# Patient Record
Sex: MALE | Race: WHITE | NOT HISPANIC OR LATINO | Employment: OTHER | URBAN - METROPOLITAN AREA
[De-identification: names, ages, dates, MRNs, and addresses within clinical notes are randomized per-mention and may not be internally consistent; named-entity substitution may affect disease eponyms.]

---

## 2017-08-16 ENCOUNTER — HISTORICAL (OUTPATIENT)
Dept: RADIOLOGY | Facility: HOSPITAL | Age: 67
End: 2017-08-16

## 2017-08-24 ENCOUNTER — HISTORICAL (OUTPATIENT)
Dept: RADIOLOGY | Facility: HOSPITAL | Age: 67
End: 2017-08-24

## 2017-09-05 ENCOUNTER — HISTORICAL (OUTPATIENT)
Dept: RADIOLOGY | Facility: HOSPITAL | Age: 67
End: 2017-09-05

## 2017-09-26 ENCOUNTER — HISTORICAL (OUTPATIENT)
Dept: LAB | Facility: HOSPITAL | Age: 67
End: 2017-09-26

## 2017-11-03 ENCOUNTER — HISTORICAL (OUTPATIENT)
Dept: LAB | Facility: HOSPITAL | Age: 67
End: 2017-11-03

## 2018-01-10 ENCOUNTER — HISTORICAL (OUTPATIENT)
Dept: RADIOLOGY | Facility: HOSPITAL | Age: 68
End: 2018-01-10

## 2018-02-08 ENCOUNTER — HISTORICAL (OUTPATIENT)
Dept: RADIOLOGY | Facility: HOSPITAL | Age: 68
End: 2018-02-08

## 2018-02-16 ENCOUNTER — HISTORICAL (OUTPATIENT)
Dept: LAB | Facility: HOSPITAL | Age: 68
End: 2018-02-16

## 2018-04-10 ENCOUNTER — HISTORICAL (OUTPATIENT)
Dept: LAB | Facility: HOSPITAL | Age: 68
End: 2018-04-10

## 2018-04-16 ENCOUNTER — HISTORICAL (OUTPATIENT)
Dept: SURGERY | Facility: HOSPITAL | Age: 68
End: 2018-04-16

## 2018-06-14 ENCOUNTER — HISTORICAL (OUTPATIENT)
Dept: RADIOLOGY | Facility: HOSPITAL | Age: 68
End: 2018-06-14

## 2018-10-03 ENCOUNTER — HISTORICAL (OUTPATIENT)
Dept: RADIOLOGY | Facility: HOSPITAL | Age: 68
End: 2018-10-03

## 2018-11-05 ENCOUNTER — HISTORICAL (OUTPATIENT)
Dept: LAB | Facility: HOSPITAL | Age: 68
End: 2018-11-05

## 2019-05-13 ENCOUNTER — HISTORICAL (OUTPATIENT)
Dept: LAB | Facility: HOSPITAL | Age: 69
End: 2019-05-13

## 2019-05-13 LAB
ALBUMIN SERPL-MCNC: 3.6 GM/DL (ref 3.4–5)
ALBUMIN/GLOB SERPL: 0.9 RATIO
ALP SERPL-CCNC: 84 UNIT/L (ref 30–113)
ALT SERPL-CCNC: 33 UNIT/L (ref 10–45)
AST SERPL-CCNC: 31 UNIT/L (ref 15–37)
BILIRUB SERPL-MCNC: 0.3 MG/DL (ref 0.1–0.9)
BILIRUBIN DIRECT+TOT PNL SERPL-MCNC: 0.1 MG/DL
BILIRUBIN DIRECT+TOT PNL SERPL-MCNC: 0.2 MG/DL (ref 0–0.3)
BUN SERPL-MCNC: 8 MG/DL (ref 10–20)
CALCIUM SERPL-MCNC: 8.8 MG/DL (ref 8–10.5)
CHLORIDE SERPL-SCNC: 99 MMOL/L (ref 100–108)
CHOLEST SERPL-MCNC: 148 MG/DL (ref 140–200)
CHOLEST/HDLC SERPL: 2 MG/DL (ref 0–8)
CO2 SERPL-SCNC: 29 MMOL/L (ref 21–35)
CREAT SERPL-MCNC: 0.9 MG/DL (ref 0.7–1.3)
GLOBULIN SER-MCNC: 3.8 GM/DL
GLUCOSE SERPL-MCNC: 99 MG/DL (ref 75–116)
HDLC SERPL-MCNC: 90 MG/DL (ref 35–59)
LDLC SERPL CALC-MCNC: 41 MG/DL (ref 100–129)
POTASSIUM SERPL-SCNC: 4 MMOL/L (ref 3.6–5.2)
PROT SERPL-MCNC: 7.4 GM/DL (ref 6.4–8.2)
SODIUM SERPL-SCNC: 138 MMOL/L (ref 135–145)
TRIGL SERPL-MCNC: 62 MG/DL (ref 35–150)
VIT B12 SERPL-MCNC: 275 PG/ML (ref 193–986)
VLDLC SERPL CALC-MCNC: 12 MG/DL

## 2019-05-14 LAB — FOLATE SERPL-MCNC: 27.4 NG/ML (ref 3.1–17.5)

## 2019-07-30 ENCOUNTER — HISTORICAL (OUTPATIENT)
Dept: RADIOLOGY | Facility: HOSPITAL | Age: 69
End: 2019-07-30

## 2019-11-04 ENCOUNTER — HISTORICAL (OUTPATIENT)
Dept: LAB | Facility: HOSPITAL | Age: 69
End: 2019-11-04

## 2019-11-12 ENCOUNTER — HISTORICAL (OUTPATIENT)
Dept: LAB | Facility: HOSPITAL | Age: 69
End: 2019-11-12

## 2020-10-08 ENCOUNTER — HISTORICAL (OUTPATIENT)
Dept: LAB | Facility: HOSPITAL | Age: 70
End: 2020-10-08

## 2020-10-19 ENCOUNTER — HOSPITAL ENCOUNTER (OUTPATIENT)
Dept: MEDSURG UNIT | Facility: HOSPITAL | Age: 70
End: 2020-10-19
Attending: SURGERY | Admitting: SURGERY

## 2020-11-03 ENCOUNTER — HISTORICAL (OUTPATIENT)
Dept: LAB | Facility: HOSPITAL | Age: 70
End: 2020-11-03

## 2020-11-18 ENCOUNTER — HISTORICAL (OUTPATIENT)
Dept: RADIOLOGY | Facility: HOSPITAL | Age: 70
End: 2020-11-18

## 2021-03-23 ENCOUNTER — HOSPITAL ENCOUNTER (OUTPATIENT)
Dept: ONCOLOGY | Facility: HOSPITAL | Age: 71
End: 2021-03-24
Attending: SURGERY | Admitting: SURGERY

## 2021-09-13 ENCOUNTER — HISTORICAL (OUTPATIENT)
Dept: RADIOLOGY | Facility: HOSPITAL | Age: 71
End: 2021-09-13

## 2021-12-02 ENCOUNTER — HISTORICAL (OUTPATIENT)
Dept: LAB | Facility: HOSPITAL | Age: 71
End: 2021-12-02

## 2021-12-02 LAB
ABS NEUT (OLG): 4.6 X10(3)/MCL (ref 1.5–6.9)
ALBUMIN SERPL-MCNC: 3.5 GM/DL (ref 3.4–4.8)
ALBUMIN/GLOB SERPL: 1 RATIO (ref 1.1–2)
ALP SERPL-CCNC: 122 UNIT/L (ref 40–150)
ALT SERPL-CCNC: 36 UNIT/L (ref 0–55)
AST SERPL-CCNC: 48 UNIT/L (ref 5–34)
BASOPHILS # BLD AUTO: 0.1 X10(3)/MCL (ref 0–0.1)
BASOPHILS NFR BLD AUTO: 1 % (ref 0–1)
BILIRUB SERPL-MCNC: 1 MG/DL
BILIRUBIN DIRECT+TOT PNL SERPL-MCNC: 0.5 MG/DL (ref 0–0.5)
BILIRUBIN DIRECT+TOT PNL SERPL-MCNC: 0.5 MG/DL (ref 0–0.8)
BUN SERPL-MCNC: 6 MG/DL (ref 8.4–25.7)
CALCIUM SERPL-MCNC: 9.4 MG/DL (ref 8.7–10.5)
CHLORIDE SERPL-SCNC: 98 MMOL/L (ref 98–107)
CHOLEST SERPL-MCNC: 127 MG/DL
CHOLEST/HDLC SERPL: 2 {RATIO} (ref 0–5)
CO2 SERPL-SCNC: 34 MMOL/L (ref 23–31)
CREAT SERPL-MCNC: 0.73 MG/DL (ref 0.73–1.18)
EOSINOPHIL # BLD AUTO: 0.1 X10(3)/MCL (ref 0–0.6)
EOSINOPHIL NFR BLD AUTO: 1 % (ref 0–5)
ERYTHROCYTE [DISTWIDTH] IN BLOOD BY AUTOMATED COUNT: 12.5 % (ref 11.5–17)
FOLATE SERPL-MCNC: 5.3 NG/ML (ref 7–31.4)
GLOBULIN SER-MCNC: 3.5 GM/DL (ref 2.4–3.5)
GLUCOSE SERPL-MCNC: 89 MG/DL (ref 82–115)
HCT VFR BLD AUTO: 42.3 % (ref 42–52)
HDLC SERPL-MCNC: 67 MG/DL (ref 35–60)
HGB BLD-MCNC: 14.4 GM/DL (ref 14–18)
IMM GRANULOCYTES # BLD AUTO: 0.02 10*3/UL (ref 0–0.02)
IMM GRANULOCYTES NFR BLD AUTO: 0.3 % (ref 0–0.43)
LDLC SERPL CALC-MCNC: 50 MG/DL (ref 50–140)
LYMPHOCYTES # BLD AUTO: 1.9 X10(3)/MCL (ref 0.5–4.1)
LYMPHOCYTES NFR BLD AUTO: 24 % (ref 15–40)
MCH RBC QN AUTO: 35 PG (ref 27–34)
MCHC RBC AUTO-ENTMCNC: 34 GM/DL (ref 31–36)
MCV RBC AUTO: 103 FL (ref 80–99)
MONOCYTES # BLD AUTO: 1 X10(3)/MCL (ref 0–1.1)
MONOCYTES NFR BLD AUTO: 13 % (ref 4–12)
NEUTROPHILS # BLD AUTO: 4.6 X10(3)/MCL (ref 1.5–6.9)
NEUTROPHILS NFR BLD AUTO: 60 % (ref 43–75)
PLATELET # BLD AUTO: 292 X10(3)/MCL (ref 140–400)
PMV BLD AUTO: 9 FL (ref 6.8–10)
POTASSIUM SERPL-SCNC: 4.4 MMOL/L (ref 3.5–5.1)
PROT SERPL-MCNC: 7 GM/DL (ref 5.8–7.6)
RBC # BLD AUTO: 4.09 X10(6)/MCL (ref 4.7–6.1)
SODIUM SERPL-SCNC: 138 MMOL/L (ref 136–145)
TRIGL SERPL-MCNC: 52 MG/DL (ref 34–140)
VIT B12 SERPL-MCNC: 467 PG/ML (ref 213–816)
VLDLC SERPL CALC-MCNC: 10 MG/DL
WBC # SPEC AUTO: 7.7 X10(3)/MCL (ref 4.5–11.5)

## 2021-12-13 ENCOUNTER — HISTORICAL (OUTPATIENT)
Dept: ADMINISTRATIVE | Facility: HOSPITAL | Age: 71
End: 2021-12-13

## 2021-12-23 ENCOUNTER — HISTORICAL (OUTPATIENT)
Dept: LAB | Facility: HOSPITAL | Age: 71
End: 2021-12-23

## 2021-12-23 LAB
ABS NEUT (OLG): 4.6 X10(3)/MCL (ref 1.5–6.9)
APTT PPP: 32.7 SEC (ref 23.4–34.9)
BUN SERPL-MCNC: 8 MG/DL (ref 8.4–25.7)
CALCIUM SERPL-MCNC: 9.7 MG/DL (ref 8.7–10.5)
CHLORIDE SERPL-SCNC: 99 MMOL/L (ref 98–107)
CO2 SERPL-SCNC: 30 MMOL/L (ref 23–31)
CREAT SERPL-MCNC: 0.72 MG/DL (ref 0.73–1.18)
CREAT/UREA NIT SERPL: 11
ERYTHROCYTE [DISTWIDTH] IN BLOOD BY AUTOMATED COUNT: 13.7 % (ref 11.5–17)
GLUCOSE SERPL-MCNC: 115 MG/DL (ref 82–115)
HCT VFR BLD AUTO: 38.3 % (ref 42–52)
HGB BLD-MCNC: 13.4 GM/DL (ref 14–18)
INR PPP: 1.1 (ref 2–3)
MCH RBC QN AUTO: 35 PG (ref 27–34)
MCHC RBC AUTO-ENTMCNC: 35 GM/DL (ref 31–36)
MCV RBC AUTO: 101 FL (ref 80–99)
PLATELET # BLD AUTO: 213 X10(3)/MCL (ref 140–400)
PMV BLD AUTO: 9.4 FL (ref 6.8–10)
POTASSIUM SERPL-SCNC: 4.1 MMOL/L (ref 3.5–5.1)
PROTHROMBIN TIME: 14.1 SEC (ref 11.7–14.5)
RBC # BLD AUTO: 3.79 X10(6)/MCL (ref 4.7–6.1)
SODIUM SERPL-SCNC: 136 MMOL/L (ref 136–145)
WBC # SPEC AUTO: 7.1 X10(3)/MCL (ref 4.5–11.5)

## 2022-02-16 ENCOUNTER — HISTORICAL (OUTPATIENT)
Dept: RADIOLOGY | Facility: HOSPITAL | Age: 72
End: 2022-02-16

## 2022-02-18 ENCOUNTER — HISTORICAL (OUTPATIENT)
Dept: LAB | Facility: HOSPITAL | Age: 72
End: 2022-02-18

## 2022-02-18 LAB
ALBUMIN SERPL-MCNC: 3.7 G/DL (ref 3.4–4.8)
ALBUMIN/GLOB SERPL: 1.2 {RATIO} (ref 1.1–2)
ALP SERPL-CCNC: 119 U/L (ref 40–150)
ALT SERPL-CCNC: 21 U/L (ref 0–55)
AST SERPL-CCNC: 25 U/L (ref 5–34)
BILIRUB SERPL-MCNC: 0.6 MG/DL
BILIRUBIN DIRECT+TOT PNL SERPL-MCNC: 0.2 (ref 0–0.8)
BILIRUBIN DIRECT+TOT PNL SERPL-MCNC: 0.4 (ref 0–0.5)
BUN SERPL-MCNC: 8 MG/DL (ref 8.4–25.7)
CALCIUM SERPL-MCNC: 9.3 MG/DL (ref 8.7–10.5)
CHLORIDE SERPL-SCNC: 95 MMOL/L (ref 98–107)
CO2 SERPL-SCNC: 29 MMOL/L (ref 23–31)
CREAT SERPL-MCNC: 0.77 MG/DL (ref 0.73–1.18)
GLOBULIN SER-MCNC: 3.2 G/DL (ref 2.4–3.5)
GLUCOSE SERPL-MCNC: 98 MG/DL (ref 82–115)
HEMOLYSIS INTERF INDEX SERPL-ACNC: 3
ICTERIC INTERF INDEX SERPL-ACNC: 0
LIPEMIC INTERF INDEX SERPL-ACNC: <0
POTASSIUM SERPL-SCNC: 4.1 MMOL/L (ref 3.5–5.1)
PROT SERPL-MCNC: 6.9 G/DL (ref 5.8–7.6)
PSA SERPL-MCNC: 0.68 NG/ML
SODIUM SERPL-SCNC: 134 MMOL/L (ref 136–145)
TSH SERPL-ACNC: 1.65 M[IU]/L (ref 0.35–4.94)

## 2022-02-19 LAB — DEPRECATED CALCIDIOL+CALCIFEROL SERPL-MC: 17.3 NG/ML (ref 30–80)

## 2022-03-08 ENCOUNTER — HISTORICAL (OUTPATIENT)
Dept: ADMINISTRATIVE | Facility: HOSPITAL | Age: 72
End: 2022-03-08

## 2022-03-08 ENCOUNTER — HISTORICAL (OUTPATIENT)
Dept: RADIOLOGY | Facility: HOSPITAL | Age: 72
End: 2022-03-08

## 2022-04-07 ENCOUNTER — HISTORICAL (OUTPATIENT)
Dept: ADMINISTRATIVE | Facility: HOSPITAL | Age: 72
End: 2022-04-07
Payer: MEDICARE

## 2022-04-24 VITALS
BODY MASS INDEX: 27.09 KG/M2 | SYSTOLIC BLOOD PRESSURE: 126 MMHG | HEIGHT: 72 IN | DIASTOLIC BLOOD PRESSURE: 77 MMHG | WEIGHT: 200 LBS

## 2022-04-30 NOTE — OP NOTE
DATE OF SURGERY:    03/23/2021    SURGEON:  Sarthak Alcantar MD    PROCEDURE:    1. Open reduction and internal fixation of zygomaxillary complex fracture.  2. Left orbital floor reconstruction with orbital floor implant.  3. Bilateral forced duction.    INDICATION FOR PROCEDURE:  This is a 70-year-old gentleman.  He unfortunately had a fall on the 14th of this month, kind of fell as he stood up too fast on his porch and hit the left side of his face.  He had a CT maxillofacial done by our emergency room colleagues, which revealed a zygomaxillary complex fracture as well as fairly large orbital floor fracture on the left side.  Options were discussed, and the patient elected to proceed with open reduction and internal fixation of the ZMC fracture and left orbital floor reconstruction with placement of orbital floor implant.  Informed consent was obtained.    DESCRIPTION OF PROCEDURE:  The patient was identified in the preoperative holding area.  Informed consent was reviewed.  He was subsequently taken to the operating room and placed in supine position.  General endotracheal anesthesia was provided.  He was prepped and draped in sterile surgical fashion.  Time-out was taken.  Everyone in agreement.  I initially started the incision by injecting 1% lidocaine with epinephrine into our planned areas of incision that I marked with a marking pen.  This was a lateral brow incision as well as an orbital rim incision and a left-sided buccal sulcus incision and upon the maxilla.  After lidocaine was injected, we gave sufficient time for the vasoconstrictive effects to occur.  I initially started by exposing the fracture.  I incised the left eyebrow incision further, dissected down to the fracture line, which was readily identified.  It was very mildly displaced.  I used periosteal elevator to clean the periosteum off this fracture line and also evaluated the lateral orbital wall and once again, then further incised the  infraorbital incision and went down through the subcutaneous tissues with electrocautery and the orbicularis oris muscle down to the orbital rim.  Orbital rim periosteum was incised with a periosteal elevator, then used periosteal elevator to clean off the zygoma leading onto the maxilla.  I did notice that the fracture essentially extended down through the infraorbital foramen.  It was very mildly displaced as well.  I then once again performed a 3rd incision intraorally.  This was incised using a [place] tip Bovie.  Further dissection was achieved with a periosteal elevator.  After I reached the periosteum, I readily identified the fracture as well and cleaned the periosteum off the bone.  Using multiple techniques, I was able to move the zygomatic bone into what appeared to be very good anatomic reduction.  I subsequently then elected to start plating, initially started plating the zygomaticofrontal suture.  This was plated with a 4-hole plate that had slight extension in between the holes on both sides that spanned the defect.  This was secured while holding reduction and subsequently secured with screws.  I evaluated and ensured the reduction was good, evaluating the lateral orbital rim to make the reduction was good.  It was noted to be very good.  I then subsequently placed another plate.  We used the orbital rim plate which I had trimmed to 5 holes and then subsequently placed a total of 4 screws to hold it in place.  It appeared to be very good reduction and held well.  I then subsequently finally placed my left zygomaxillary plate.  This was an L-shaped plate and additionally placed an additional 4 screws to hold it in place, which appeared to be holding very well and a good anatomical reduction.  I then further explored the orbital floor, elevating the periosteum posteriorly and using malleable-type retractors to reduce the orbital contents.  It was noted to be a very large orbital floor fracture.  I  subsequently reduced the contents so the posterior edge measured the size of orbital floor defect.  I subsequently obtained a Titan Medpor 3D implant, trimmed it to the appropriate size and then placed it within the defect, spanning the defect, and secured it to the orbital rim with 2 screws.  I then copiously irrigated out the wound with Betadine solution, washed them out further with saline, ensured my reduction was good.  I did do a forced duction to ensure that after placement of orbital floor implant nothing was entrapped, which it was not.  I then subsequently began with closure of the incisions.  The eyebrow incision was closed with 4-0 Vicryl sutures in the deep subcutaneous tissues and muscle, and then 5-0 Prolene on the skin.  The infraorbital incision was similarly closed with 4-0 Vicryl sutures to resuspend the soft tissues, 4-0 Vicryl to close the orbicularis and then a 5-0 Prolene on the skin.  Regarding intraoral incision, this was closed with a 3-0 chromic suture in a running fashion and then further reinforced with interrupted 4-0 Vicryl sutures.  Please note, we did use Peridex solution on the patient to wash out the patient's mouth before and after the procedure.  There were no complications.  All counts were correct.        ______________________________  MD BELINDA Easley/MEENU  DD:  03/23/2021  Time:  11:48AM  DT:  03/23/2021  Time:  12:22PM  Job #:  [id number]

## 2022-04-30 NOTE — ED PROVIDER NOTES
Patient:   Modesto Lorenzo            MRN: [id number]            FIN: [id number]               Age:   69 years     Sex:  Male     :  1950   Associated Diagnoses:   Distal esophageal obstruction due to foreign body   Author:   Lauren Perkins MD      Basic Information   Time seen: Date & time 10/19/2020 16:00:00.   History source: Patient.   Arrival mode: Private vehicle, walking.   History limitation: None.   Additional information: Patient's physician(s): Roland Santiago MD, Chief Complaint from Nursing Triage Note : Chief Complaint   10/19/2020 15:45 CDT     Chief Complaint           c/o having piece of steak stuck in throat since Saturday night, was seen at Urgent care today  .      History of Present Illness   The patient presents with a foreign body in the throat.  The onset was 3  days ago and 10/17/2020 .  The course/duration of symptoms is constant.  Symptoms: difficulty swallowing, Unable to swallow anything including secretions and not tolerating secretions.  Location: esophagus. The type of foreign body is meat.  The degree of symptoms is moderate.  The exacerbating factor is none.  The relieving factor is none.  Risk factors consist of none.  Prior episodes: occurred one other time.  Therapy today: none.  Associated symptoms: none.        Review of Systems   Constitutional symptoms:  Negative except as documented in HPI.   Skin symptoms:  Negative except as documented in HPI.   Eye symptoms:  Negative except as documented in HPI.   ENMT symptoms:  Negative except as documented in HPI.   Respiratory symptoms:  Negative except as documented in HPI.   Cardiovascular symptoms:  Negative except as documented in HPI.   Gastrointestinal symptoms:  Negative except as documented in HPI.   Genitourinary symptoms:  Negative except as documented in HPI.   Musculoskeletal symptoms:  Negative except as documented in HPI.   Neurologic symptoms:  Negative except as documented in HPI.    Psychiatric symptoms:  Negative except as documented in HPI.   Endocrine symptoms:  Negative except as documented in HPI.   Hematologic/Lymphatic symptoms:  Negative except as documented in HPI.   Allergy/immunologic symptoms:  Negative except as documented in HPI.             Additional review of systems information: All systems reviewed as documented in chart.      Health Status   Allergies:    Allergies (1) Active Reaction  No Known Medication Allergies None Documented  , no known allergies.   Medications:  (Selected)   Inpatient Medications  Ordered  Naropin 0.5% injectable solution: 30 mL, 150 mg =, form: Injection, Ax Block, Once, first dose 04/16/18 7:13:00 CDT, stop date 04/16/18 7:13:00 CDT, NOW, Right Interscalene Block actual procedure  Pending Complete  midazolam: 2 mg, form: Injection, IV Push, q5min, Order duration: 2 dose(s), first dose 04/16/18 6:20:00 CDT, stop date 04/16/18 6:29:00 CDT, (up to 5 mg for moderate anxiety)  Prescriptions  Prescribed  Norco 7.5 mg-325 mg oral tablet: 1 tab(s), Oral, q4hr, PRN PRN for pain, # 40 tab(s), 0 Refill(s), [facility]  Phenergan 12.5 mg Tab: 12.5 mg = 1 tab(s), Oral, q6hr, PRN PRN for nausea, # 15 tab(s), 0 Refill(s)  aspirin 81 mg oral Delayed Release (EC) tablet: 81 mg = 1 tab(s), Oral, Daily, # 10 tab(s), 0 Refill(s)  Documented Medications  Documented  ATORVASTATIN 20 MG TABLET: 20 mg = 1 tab(s), Oral, Daily  CITALOPRAM HBR 20 MG TABLET: 20 mg = 1 tab(s), Oral, Daily  FOLIC ACID 1 MG TABLET: 1 mg = 1 tab(s), Oral, Daily  LOSARTAN POTASSIUM 100 MG TAB: 100 mg = 1 tab(s), Oral, Daily  Zolpidem 10mg Tab: 10 mg = 1 tab(s), Oral, qPM.      Past Medical/ Family/ Social History   Medical history:    Active  Tubular adenoma of colon ([id number]): Onset on 12/14/2016 at 65 years.  Comments:  12/16/2016 CST 7:35 CST - Carrol De Jesus LPN  ascending colon biopsy tubular adenoma  Colon polyps  ([id number])  Erectile dysfunction ([id number])  Essential hypertension (07176840)  Obstructive sleep apnea ([id number])  Idiopathic peripheral neuropathy ([id number])  Comments:  2016 CST 8:23 Carrol Rizo LPN.  both feet  Pressure ulcer with abrasion, blister, partial thickness skin loss involving epidermis and/or dermis ([id number])  Screening for colon cancer ([id number]).   Surgical history:    ORIF Humerus (Right) on 2018 at 67 Years.  Comments:  2018 9:26 Rom Rolle RN  auto-populated from documented surgical case  Biopsy Gastrointestional on 2016 at 65 Years.  Comments:  2016 9:40 Melida Landry RN  auto-populated from documented surgical case  Colon Tattoo on 2016 at 65 Years.  Comments:  2016 9:40 Melida Landry RN  auto-populated from documented surgical case  Polypectomy on 2016 at 65 Years.  Comments:  2016 9:40 Melida Landry RN  auto-populated from documented surgical case  Colonoscopy on 2016 at 65 Years.  Comments:  2016 9:40 Melida Landry RN  auto-populated from documented surgical case  Colonoscopy ([id number]) in the month of 2009 at 58 Years.  Angiogram ([id number]).  Comments:  2018 16:03 Radha Ochoa W/DR. LYRIC BOUDREAUX 2017..   Family history:    Primary malignant neoplasm of lung  Father ()  .   Social history:    Social & Psychosocial Habits    Alcohol  2018  Use: Current    Frequency: Daily    Comment: Sociable - 2016 08:20 - Carrol De Jesus LPN    Employment/School  2016  Status: Unemployed    Home/Environment  2016  Lives with: Spouse    Living situation: Home/Independent    Alcohol abuse in household: No    Substance abuse in household: No    Smoker in household: No    Injuries/Abuse/Neglect in household: No    Feels unsafe at home: No    Safe place to go:  Yes    Agency(s)/Others notified: No    Family/Friends available to help: Yes    Concern for family members at home: No    Major illness in household: No    Financial concerns: No    Concerns over TV/Computer/Game use: No    Nutrition/Health  11/09/2016  Type of diet: Regular    Substance Use  03/21/2018  Use: Current    Type: Marijuana    Frequency: Daily    Comment: Denies - 11/09/2016 08:22 - Carrol De Jesus LPN    Tobacco  04/13/2018  Use: Former smoker    Comment: uses smokeless tobacco - 04/13/2018 10:11 - Clarissa Cisneros RN    10/19/2020  Use: 10 or more cigarettes (1/    Type: Cigarettes    Patient Wants Consult For Cessation Counseling No    Abuse/Neglect  10/19/2020  SHX Any signs of abuse or neglect No  , Alcohol use: Regularly, Tobacco use: Regularly, Drug use: Marijuana, Occupation: Retired, Family/social situation: .   Problem list:    Active Problems (12)  Colon polyps   Erectile dysfunction   Essential hypertension   Idiopathic peripheral neuropathy   Kidney failure   Obstructive sleep apnea   Paralysis   Pressure ulcer with abrasion, blister, partial thickness skin loss involving epidermis and/or dermis   Screening for colon cancer   Sleep apnea   Tobacco user   Tubular adenoma of colon   .      Physical Examination               Vital Signs   Vital Signs   10/19/2020 15:45 CDT     Temperature Oral          36.9 DegC                             Temperature Oral (calculated)             98.42 DegF                             Peripheral Pulse Rate     95 bpm                             Respiratory Rate          18 br/min                             SpO2                      97 %                             Oxygen Therapy            Room air                             Systolic Blood Pressure   153 mmHg  HI                             Diastolic Blood Pressure  90 mmHg  .      Vital Signs (last 24 hrs)_____  Last Charted___________  Temp Oral     36.9 DegC  (OCT 19 15:45)  Heart Rate  Peripheral   95 bpm  (OCT 19 15:45)  Resp Rate         18 br/min  (OCT 19 15:45)  SBP      H 153mmHg  (OCT 19 15:45)  DBP      90 mmHg  (OCT 19 15:45)  SpO2      97 %  (OCT 19 15:45)  Weight      84.5 kg  (OCT 19 15:45)  Height      175 cm  (OCT 19 15:45)  BMI      27.59  (OCT 19 15:45)  .   Measurements   10/19/2020 15:45 CDT     Weight Dosing             84.5 kg                             Weight Measured           84.5 kg                             Weight Measured and Calculated in Lbs     186.29 lb                             Height/Length Dosing      175 cm                             Height/Length Measured    175 cm                             Body Mass Index Measured  27.59 kg/m2  .   Basic Oxygen Information   10/19/2020 15:45 CDT     SpO2                      97 %                             Oxygen Therapy            Room air  .   General:  Alert, no acute distress.    Skin:  Warm, dry, intact, normal for ethnicity.    Head:  Normocephalic, atraumatic.    Neck:  Supple, no tenderness.    Eye:  Pupils are equal, round and reactive to light, extraocular movements are intact.    Ears, nose, mouth and throat:  Oral mucosa moist, Throat: Bilateral, normal.    Cardiovascular:  Regular rate and rhythm, No murmur, Normal peripheral perfusion, No edema.    Respiratory:  Lungs are clear to auscultation, respirations are non-labored, breath sounds are equal, Symmetrical chest wall expansion.    Chest wall:  No tenderness, No deformity.    Back:  Nontender, Normal range of motion, Normal alignment.    Musculoskeletal:  Normal ROM, normal strength, no tenderness, no swelling, no deformity.    Gastrointestinal:  Soft, Nontender, Non distended, Normal bowel sounds.    Neurological:  Alert and oriented to person, place, time, and situation, No focal neurological deficit observed, normal sensory observed, normal motor observed, normal speech observed, normal coordination observed.    Lymphatics:  No lymphadenopathy.    Psychiatric:  Cooperative, appropriate mood & affect, normal judgment.       Medical Decision Making   Documents reviewed:  Emergency department nurses' notes.   Orders  Launch Orders   Radiology:  XR Barium Swallow Esophagram (Order): Routine, 10/19/2020 16:04 CDT, Other (please specify), esophageal foreign body, None, Wheelchair, Patient Has IV?, Rad Type, Gastrografin, Schedule this test, [facility], Launch Orders   Laboratory:  PTT (Order): Stat collect, 10/19/2020 17:28 CDT, Blood, Lab Collect, 10/19/2020 17:28 CDT  INR - Protime (Order): Stat collect, 10/19/2020 17:28 CDT, Blood, Lab Collect, 10/19/2020 17:28 CDT  CMP (Order): Stat collect, 10/19/2020 17:28 CDT, Blood, Lab Collect, 10/19/2020 17:28 CDT  CBC w/ Auto Diff (Order): Now collect, 10/19/2020 17:28 CDT, Blood, Lab Collect, 10/19/2020 17:28 CDT  Patient Care:  Saline Lock Insert (Order): 10/19/2020 17:28 CDT  Radiology:  XR Chest 1 View (Order): Stat, 10/19/2020 17:28 CDT, Pre-Op, None, Portable, Patient Has IV?, Rad Type, Not Scheduled  Cardiology:  EKG (Order): 10/19/2020 17:28 CDT, Stat, Once, 10/19/2020 17:28 CDT, Patient Bed, Patient Has IV, Standard Precautions, -1, -1, 10/19/2020 17:28 CDT.    Electrocardiogram:  Time 10/19/2020 17:35:00, rate 70, normal sinus rhythm, No ST-T changes, no ectopy, EP Interp, sinus rhythm with 1st degree AV Block.    Results review:  Lab results : Lab View   10/19/2020 17:35 CDT     WBC                       7.4 x10(3)/mcL                             RBC                       3.95 x10(6)/mcL  LOW                             Hgb                       14.2 gm/dL                             Hct                       40.6 %  LOW                             Platelet                  213 x10(3)/mcL                             MCV                       103 fL  HI                             MCH                       36 pg  HI                             MCHC                      35 gm/dL                              RDW                       12.8 %                             MPV                       8.7 fL                             Abs Neut                  4.6 x10(3)/mcL                             Neutro Auto               63 %                             Lymph Auto                17 %                             Mono Auto                 18 %  HI                             Eos Auto                  0 %                             Abs Eos                   0.0 x10(3)/mcL                             Basophil Auto             1 %                             Abs Neutro                4.6 x10(3)/mcL                             Abs Lymph                 1.3 x10(3)/mcL                             Abs Mono                  1.4 x10(3)/mcL  HI                             Abs Baso                  0.1 x10(3)/mcL                             IG%                       0.300 %                             IG#                       0.0200  HI  ,    No qualifying data available.    Radiology results:  Rad Results (ST)  < 12 hrs   Accession: [id number]  Order: XR Esophagram Single Contrast  Report Dt/Tm: 10/19/2020 17:16  Report:   ESOPHAGRAM:     FLUORO TIME USED:     HISTORY: Foreign body evaluation     IMPRESSION:     There is a luminal filling defect at the distal esophagus immediately  proximal to or at the the gastroesophageal junction. Findings are  consistent with retained foreign body versus mass lesion    .   Notes:  Patient's presenting condition, medical history, lab results, and imaging discussed with Dr. Perkins who had face time with patient and agrees with treatment plan..      Reexamination/ Reevaluation   Vital signs   Basic Oxygen Information   10/19/2020 15:45 CDT     SpO2                      97 %                             Oxygen Therapy            Room air     Notes:     I am Dr. Perkins, I assumed the care of patient after initial workup was started by NP.    Patient presented to the emergency room with  inability to swallow since Saturday when he ate a steak and it sort of stuck behind his lower chest.      Awake, alert, no distress.  Talking in full sentences  Heart: S1, S2 are audible.  There is no S3 no S4, no murmurs.  Chest is clear to auscultation.  No rales, no rhonchi.  Abdomen is soft, nondistended, nontender.  Bowel sounds are audible.  CNS: No focal deficit  Capillary refill is less than 2 seconds.  Peripheral pulses are palpable bilaterally symmetrical      Patient's workup in the emergency room reveals he has either a FB or mass lesion in lower esophagus. pt. has h/o same thing 2 years back also.    I called Dr. Clyde Sapp, and he is going to see the patient.    Components of this note were documented using voice recognition systems and are subject to errors not corrected at proof reading.  Please contact the author for any clarifications..      Impression and Plan   Diagnosis   Distal esophageal obstruction due to foreign body ([id number] T18.108A)      Calls-Consults   -  10/19/2020 17:45:00 , Senait GARCIA, Jeaneth LIU, recommends OK .    Plan   Condition: Stable.    Disposition: Patient care transitioned to: Time: 10/19/2020 17:43:00, [facility]    Counseled: Patient.    Orders: Launch Orders   Admit/Transfer/Discharge:  Place in Outpatient Observation (Order): 10/19/2020 17:56 CDT, Medical Unit Senait GARCIA, Jeaneth LIU, No, Dysphagia, lower esophageal foreign body.       Addendum      Teaching-Supervisory Addendum-Brief   Notes:         I am Dr. Perkins and I performed a face to face evaluation of this patient. This case was initially evaluated and workup started by Nurse practitioner under my supervision.    I participated in the following activities of this patients care: the medical history.   I personally performed: the Brief physical exam, the medical decision making.   Evaluation and management service: I agree with the evaluation and management decisions made in this patient's care.     Results  interpretation: I agree with the study interpretation in this patient's care     See reevaluation area for my evaluation of the pt.  .

## 2022-04-30 NOTE — OP NOTE
Patient:   Modesto Lorenzo            MRN: [id number]            FIN: [id number]               Age:   67 years     Sex:  Male     :  1950   Associated Diagnoses:   None   Author:   Javid Wilkins MD      OPERATIVE REPORT    DATE: 2018    SURGEON: Javid Wilkins MD    ASSISTANT: Saniya Pope    PREOPERATIVE DIAGNOSIS:   1.  Right greater tuberosity fracture    POSTOPERATIVE DIAGNOSIS:  Same    PROCEDURE:  1.  Open reduction internal fixation of right greater tuberosity fracture    TYPE OF ANESTHESIA:    General anesthesia nerve    BLOOD LOSS:  75 cc    DVT PROPHYLAXIS:   This patient placed on aspirin probably for 2 weeks.    INSTRUMENTATION:  Arthrex proximal humerus locking plate    HISTORY AND INDICATIONS:  Refer to the last orthopedic office visit note    PROCEDURE IN DETAIL:     Open reduction internal fixation of right greater tuberosity fracture    The patient is placed in a beach chair position and bony prominences were padded and the neck was placed in a neutral position. The patient was prepped and draped in normal sterile fashion and a time-out was done.    An incision from the coracoid process to the axilla was made. The cephalic vein was visualized and the deltopectoral interval was made taking the vein laterally. Then the subscapularis was visualized. Retractors were place laterally, superiorly, and medially.     I could then visualize the fracture with more of a supraspinatus/infraspinatus attachment to the greater tuberosity.  I could visualize the biceps tendon.  I placed the plate just lateral to the biceps tendon.  I placed the height of the plate using fluoroscopy.  I want to make sure the plate would not impinge on the acromion.  I then placed screws into the distal end of the plate to pull it to the bone.  I then used the plate proximally as anchor.  I did this because the fracture was highly comminuted.  I then used #5 FiberWire to place through the tendon bone interface and  then tied through the plate and used the plate is an anchor.  I did this for the supraspinatus and infraspinatus portions of the tendon.  I confirmed the tuberosity reduction with fluoroscopy and looked appropriate.  I then thoroughly irrigated out the wound with Pulsavac.    Deltopectoral fascia was closed with 1-0 vicryl.. The skin was closed with a 2-0 vicryl and skin with staples.

## 2022-04-30 NOTE — OP NOTE
Patient:   Modesto Lorenzo            MRN: [id number]            FIN: [id number]               Age:   69 years     Sex:  Male     :  1950   Associated Diagnoses:   Throat foreign body; Distal esophageal obstruction due to foreign body   Author:   Jeaneth Sapp MD      Operative Note   Operative Information   Date/ Time:  10/19/2020 19:59:00.     Procedures Performed: Procedure Code   Esophagogastroduodenoscopy, flexible, transoral; with removal of foreign body(s) (01838)..     Indications: 69-year-old white male with difficulty with swallowing for approximately 2 days following a steak dinner.  Patient having a previous history of the same which spontaneously resolved with drinking liquids however at this time he has complete impaction and obstruction of the esophagus needing emergent extraction.     Preoperative Diagnosis: Throat foreign body (PNED [id number]), Distal esophageal obstruction due to foreign body (UIT35-GJ T18.108A).     Postoperative Diagnosis: Throat foreign body (PNED [id number]), Distal esophageal obstruction due to foreign body (VGO45-LQ T18.108A).     Surgeon: Jeaneth Sapp MD.     Anesthesia: General.     Speciman Removed: None.     Description of Procedure/Findings/    Complications: Patient was brought to the operative theater and general endotracheal intubation anesthesia was provided for protection of the airway.  An endoscope was then passed through the oropharynx intubating the esophagus with transit to about the mid to distal esophagus.  At this point a large bolus of meat was identified.  This was likely the steak dinner he had on Saturday night.  Slight pressure was applied with easy transit into the stomach.  Upon entering the stomach it was fully insufflated this endoscope was advanced to the duodenum upon entering through the pylorus significant hyperemic changes are noted of the duodenum without ulceration  or bleeding.  Pictures were taken the scope was returned back into the stomach the stomach was suctioned of all of its liquid content.  Scope was withdrawn back to the GE junction.  There were normal gross mucosal changes noted within this region and no stricture was found.  Scope was slowly withdrawn and all reflux and liquid was suctioned upper airway around the ET tube was suctioned well.  Scope was removed in neutral position the patient was relieved of anesthesia stable condition to transfer the postanesthesia care unit.     Esimated blood loss: No blood loss.     Findings: Complete obstruction of the distal esophagus secondary to food bolus.     Complications: None.

## 2022-09-29 ENCOUNTER — LAB VISIT (OUTPATIENT)
Dept: LAB | Facility: HOSPITAL | Age: 72
End: 2022-09-29
Attending: FAMILY MEDICINE
Payer: MEDICARE

## 2022-09-29 DIAGNOSIS — E53.8 VITAMIN B 12 DEFICIENCY: ICD-10-CM

## 2022-09-29 DIAGNOSIS — E78.2 MIXED HYPERLIPIDEMIA: Primary | ICD-10-CM

## 2022-09-29 LAB
ALBUMIN SERPL-MCNC: 4.3 GM/DL (ref 3.4–4.8)
ALBUMIN/GLOB SERPL: 1.3 RATIO (ref 1.1–2)
ALP SERPL-CCNC: 112 UNIT/L (ref 40–150)
ALT SERPL-CCNC: 59 UNIT/L (ref 0–55)
AST SERPL-CCNC: 100 UNIT/L (ref 5–34)
BILIRUBIN DIRECT+TOT PNL SERPL-MCNC: 1.6 MG/DL
BUN SERPL-MCNC: 5 MG/DL (ref 8.4–25.7)
CALCIUM SERPL-MCNC: 9.8 MG/DL (ref 8.8–10)
CHLORIDE SERPL-SCNC: 93 MMOL/L (ref 98–107)
CHOLEST SERPL-MCNC: 173 MG/DL
CHOLEST/HDLC SERPL: 1 {RATIO} (ref 0–5)
CO2 SERPL-SCNC: 30 MMOL/L (ref 23–31)
CREAT SERPL-MCNC: 0.74 MG/DL (ref 0.73–1.18)
FOLATE SERPL-MCNC: 10.2 NG/ML (ref 7–31.4)
GFR SERPLBLD CREATININE-BSD FMLA CKD-EPI: >60 MLS/MIN/1.73/M2
GLOBULIN SER-MCNC: 3.3 GM/DL (ref 2.4–3.5)
GLUCOSE SERPL-MCNC: 109 MG/DL (ref 82–115)
HDLC SERPL-MCNC: 118 MG/DL (ref 35–60)
LDLC SERPL CALC-MCNC: 46 MG/DL (ref 50–140)
POTASSIUM SERPL-SCNC: 4 MMOL/L (ref 3.5–5.1)
PROT SERPL-MCNC: 7.6 GM/DL (ref 5.8–7.6)
SODIUM SERPL-SCNC: 135 MMOL/L (ref 136–145)
TRIGL SERPL-MCNC: 46 MG/DL (ref 34–140)
TSH SERPL-ACNC: 1.93 UIU/ML (ref 0.35–4.94)
VIT B12 SERPL-MCNC: 341 PG/ML (ref 213–816)
VLDLC SERPL CALC-MCNC: 9 MG/DL

## 2022-09-29 PROCEDURE — 80053 COMPREHEN METABOLIC PANEL: CPT

## 2022-09-29 PROCEDURE — 82746 ASSAY OF FOLIC ACID SERUM: CPT

## 2022-09-29 PROCEDURE — 82607 VITAMIN B-12: CPT

## 2022-09-29 PROCEDURE — 84443 ASSAY THYROID STIM HORMONE: CPT

## 2022-09-29 PROCEDURE — 36415 COLL VENOUS BLD VENIPUNCTURE: CPT

## 2022-09-29 PROCEDURE — 80061 LIPID PANEL: CPT

## 2022-10-09 ENCOUNTER — HOSPITAL ENCOUNTER (EMERGENCY)
Facility: HOSPITAL | Age: 72
Discharge: HOME | End: 2022-10-09
Attending: INTERNAL MEDICINE
Payer: MEDICARE

## 2022-10-09 VITALS
HEIGHT: 72 IN | WEIGHT: 200 LBS | HEART RATE: 70 BPM | TEMPERATURE: 98 F | BODY MASS INDEX: 27.09 KG/M2 | OXYGEN SATURATION: 97 % | RESPIRATION RATE: 16 BRPM | SYSTOLIC BLOOD PRESSURE: 113 MMHG | DIASTOLIC BLOOD PRESSURE: 71 MMHG

## 2022-10-09 DIAGNOSIS — M54.50 CHRONIC LOW BACK PAIN WITHOUT SCIATICA, UNSPECIFIED BACK PAIN LATERALITY: ICD-10-CM
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
## 2022-10-09 DIAGNOSIS — G89.29 CHRONIC LOW BACK PAIN WITHOUT SCIATICA, UNSPECIFIED BACK PAIN LATERALITY: ICD-10-CM

## 2022-10-09 DIAGNOSIS — S50.319A ABRASION OF ELBOW, UNSPECIFIED LATERALITY, INITIAL ENCOUNTER: Primary | ICD-10-CM

## 2022-10-09 PROBLEM — G47.33 OBSTRUCTIVE SLEEP APNEA SYNDROME: Status: ACTIVE | Noted: 2022-10-09

## 2022-10-09 PROBLEM — N19 RENAL FAILURE: Status: ACTIVE | Noted: 2022-10-09

## 2022-10-09 PROBLEM — G83.9 PARALYSIS: Status: ACTIVE | Noted: 2022-10-09

## 2022-10-09 PROBLEM — E78.5 HYPERLIPIDEMIA: Status: ACTIVE | Noted: 2022-10-09

## 2022-10-09 PROBLEM — I10 PRIMARY HYPERTENSION: Status: ACTIVE | Noted: 2022-10-09

## 2022-10-09 PROBLEM — L89.92 PRESSURE INJURY, STAGE 2: Status: ACTIVE | Noted: 2022-10-09

## 2022-10-09 PROBLEM — N52.9 ERECTILE DYSFUNCTION: Status: ACTIVE | Noted: 2022-10-09

## 2022-10-09 PROCEDURE — 96372 THER/PROPH/DIAG INJ SC/IM: CPT | Performed by: NURSE PRACTITIONER

## 2022-10-09 PROCEDURE — 25000003 PHARM REV CODE 250: Performed by: INTERNAL MEDICINE

## 2022-10-09 PROCEDURE — 63600175 PHARM REV CODE 636 W HCPCS: Performed by: NURSE PRACTITIONER

## 2022-10-09 PROCEDURE — 99285 EMERGENCY DEPT VISIT HI MDM: CPT | Mod: 25

## 2022-10-09 RX ORDER — CITALOPRAM 20 MG/1
TABLET, FILM COATED ORAL
COMMUNITY
Start: 2022-08-24

## 2022-10-09 RX ORDER — TRAMADOL HYDROCHLORIDE 50 MG/1
50 TABLET ORAL EVERY 6 HOURS PRN
Qty: 12 TABLET | Refills: 0 | Status: SHIPPED | OUTPATIENT
Start: 2022-10-09 | End: 2022-10-12

## 2022-10-09 RX ORDER — HYDROCODONE BITARTRATE AND ACETAMINOPHEN 5; 325 MG/1; MG/1
1 TABLET ORAL
Status: COMPLETED | OUTPATIENT
Start: 2022-10-09 | End: 2022-10-09

## 2022-10-09 RX ORDER — KETOROLAC TROMETHAMINE 30 MG/ML
30 INJECTION, SOLUTION INTRAMUSCULAR; INTRAVENOUS
Status: COMPLETED | OUTPATIENT
Start: 2022-10-09 | End: 2022-10-09

## 2022-10-09 RX ORDER — BACLOFEN 20 MG/1
20 TABLET ORAL 3 TIMES DAILY PRN
Qty: 30 TABLET | Refills: 0 | Status: SHIPPED | OUTPATIENT
Start: 2022-10-09 | End: 2022-10-19

## 2022-10-09 RX ORDER — ATORVASTATIN CALCIUM 10 MG/1
TABLET, FILM COATED ORAL
COMMUNITY
Start: 2022-07-17

## 2022-10-09 RX ORDER — FOLIC ACID 0.4 MG
TABLET ORAL
COMMUNITY
Start: 2022-07-17

## 2022-10-09 RX ORDER — ZALEPLON 10 MG/1
10 CAPSULE ORAL NIGHTLY
COMMUNITY
Start: 2022-09-27

## 2022-10-09 RX ORDER — ALENDRONATE SODIUM 70 MG/1
TABLET ORAL
COMMUNITY
Start: 2022-08-09

## 2022-10-09 RX ADMIN — KETOROLAC TROMETHAMINE 30 MG: 60 INJECTION, SOLUTION INTRAMUSCULAR at 06:10

## 2022-10-09 RX ADMIN — HYDROCODONE BITARTRATE AND ACETAMINOPHEN 1 TABLET: 5; 325 TABLET ORAL at 07:10

## 2022-11-01 ENCOUNTER — TELEPHONE (OUTPATIENT)
Dept: EMERGENCY MEDICINE | Facility: HOSPITAL | Age: 72
End: 2022-11-01
Payer: MEDICARE

## 2023-01-24 ENCOUNTER — HOSPITAL ENCOUNTER (EMERGENCY)
Facility: HOSPITAL | Age: 73
Discharge: HOME | End: 2023-01-24
Attending: INTERNAL MEDICINE
Payer: MEDICARE

## 2023-01-24 DIAGNOSIS — R07.81 RIB PAIN: ICD-10-CM

## 2023-01-24 DIAGNOSIS — S20.211A CONTUSION OF RIGHT CHEST WALL, INITIAL ENCOUNTER: Primary | ICD-10-CM

## 2023-01-24 PROCEDURE — 99284 EMERGENCY DEPT VISIT MOD MDM: CPT | Mod: 25

## 2023-01-25 NOTE — ED PROVIDER NOTES
"  1/24/2023  10:15 PM    SOURCE OF HISTORY:  History obtained from the patient.    CHIEF COMPLAINT:  Back Pain (C/o R lower back pain x2 days. Felt "pop" while sweeping. Denies dysuria)      HISTORY OF PRESENT ILLNESS:  Modesto Lorenzo is a 72 y.o. male presenting with complaint of right back pain, which she hurt while he was backing up in his shop and hit the corner of a table, he says been hurting for the last 2 days, he also had a fall a couple of weeks back, recently had stitches removed from his right forehead, does not exactly remember the circumstances.    REVIEW OF SYSTEMS:     AS Per Hpi And Below:  General: No Fever.  No Chills.  Head: No Headache.  No Loss Of Consciousness Or Amnesia.  Eyes: No Visual Changes Reported.  Neck:  No Particular Neck Pain Reported By Patient.  Extremities:  Denied Any Pain In Extremities  Back: No particular Back Pain reported by Patient.  Right upper back, right lateral ribs pain  Respiratory: No Shortness Of Breath.  No Chest Pain.  Cardiovascular: No Palpitations.  Abdomen: No Abdominal Pain.  No Nausea Or Vomiting.  Skin: No Rashes Or Bruising.  Urinary: No Hematuria.  Neurologic: No Numbness.  No Focal Weakness.   All Other Systems Negative Except As Above As Reviewed With Patient.    ALLERGIES:  Review of patient's allergies indicates:  No Known Allergies    HOME MEDICINE LIST:  No current facility-administered medications for this encounter.    Current Outpatient Medications:     alendronate (FOSAMAX) 70 MG tablet, , Disp: , Rfl:     atorvastatin (LIPITOR) 10 MG tablet, , Disp: , Rfl:     baclofen (LIORESAL) 20 MG tablet, Take 1 tablet (20 mg total) by mouth 3 (three) times daily as needed (Muscle spasms)., Disp: 30 tablet, Rfl: 0    citalopram (CELEXA) 20 MG tablet, , Disp: , Rfl:     folic acid (FOLVITE) 400 MCG tablet, , Disp: , Rfl:     zaleplon (SONATA) 10 MG capsule, Take 10 mg by mouth every evening., Disp: , Rfl:     PAST MEDICAL HISTORY:  As per HPI and " below:  Past Medical History:   Diagnosis Date    Mixed hyperlipidemia     Osteoporosis        PAST SURGICAL HISTORY:  Past Surgical History:   Procedure Laterality Date    SHOULDER SURGERY Right        FAMILY HISTORY:  Family History   Problem Relation Age of Onset    Cancer Mother     Cancer Father     Lung cancer Father     Hypertension Maternal Grandfather     Heart disease Maternal Grandfather         SOCIAL HISTORY:  Social History     Tobacco Use    Smoking status: Every Day     Types: Cigarettes   Substance Use Topics    Alcohol use: Yes    Drug use: Yes     Types: Marijuana       PROBLEM LIST:  Patient Active Problem List    Diagnosis Date Noted    Erectile dysfunction 10/09/2022    Hyperlipidemia 10/09/2022    Obstructive sleep apnea syndrome 10/09/2022    Paralysis 10/09/2022    Renal failure 10/09/2022    Primary hypertension 10/09/2022    Pressure injury, stage 2 10/09/2022    Tubular adenoma of colon 12/14/2016       PHYSICAL EXAM:    There were no vitals filed for this visit.    There were no vitals taken for this visit.    Nursing Note And Vital Signs Reviewed.    APPEARANCE: No Acute Distress.  MENTAL STATUS: Alert And Oriented X 3.  GCS 15.  HEAD/FACE: Atraumatic.  Patient has the bruise on his right infraorbital area.  EYES:  Conjunctiva Normal.  No Subconjunctival Hemorrhage.  Extraocular Muscles Are Intact.  NECK: No Midline Cervical Tenderness, No Step Off Noted, No Deformities.  Full Range Of Motion.    BACK: No Midline Thoracic Tenderness, No Step Off Noted, No Deformities.  No Midline Lumbar Or Sacral Spine Tenderness, No Step-Offs Noted, No Deformities Noted.   CHEST:  Right posterolateral Chest Wall Tenderness. No Gross Bruising.  No ecchymosis, no crepitus, Breath Sounds Are Equal Bilaterally.  No Wheezes.  No Rhonchi.  No Rales.  CARDIOVASCULAR: Regular Rate And Rhythm.  No Murmurs.   ABDOMEN: Soft. Non Distended. No Tenderness.  No Guarding. No Rebound.   MUSCULOSKELETAL:  No Bony  Tenderness In The Extremities.  No Gross Deformities. Intact Range of Motion  SKIN: No Gross Ecchymoses. No Gross Signs Of Major Trauma.  NEUROLOGIC: No Focal Deficit. Speech Normal, Motor Grossly Normal.        MEDICAL DECISION MAKING:      Chart reviewed, Nurses Note Reviewed, Vital Reviewed.      ED WORKUP AND COURSE:  ED ORDERS:  Orders Placed This Encounter   Procedures    X-Ray Chest PA And Lateral    CT Head Without Contrast      ED MEDICINE:  Medications - No data to display    Medical Decision Making  72 y.o. male  has a past medical history of Mixed hyperlipidemia and Osteoporosis. presents to ER with right posterolateral rib pain, a fall which he does not remember the circumstances exactly, had stitches removed recently from his right supraorbital area, has a new bruise on the right infraorbital area since he can not recall the whole event, I will do a CT head and chest x-ray and decide further.        EDCOURSE:  ED Course as of 01/24/23 2318 Tue Jan 24, 2023 2317 Patient does not have any major rib fractures, he does not have any crepitus, bruising or ecchymosis in the right ribs, he mainly has just localized tenderness there, chest x-ray does not show any pneumothorax, has some haziness in the left lower lung field but he does not have any symptoms on that side, I will let him go back home with instruction to see his family doctor for further evaluation and treatment as needed.  I also did the CT head on him because of his age, a little bruise on his right infraorbital area, but the CT head is essentially negative for any subdural hematoma or bleed I will discharge him home with instruction to take Motrin or Tylenol as needed for pain and see family doctor for follow-up. [GQ]      ED Course User Index  [GQ] Lauren Perkins MD           ED Prescriptions    None       Follow-up Information       Follow up With Specialties Details Why Contact Info    Roland Santiago MD Family Medicine In 2 days   [address] [place] [address]  [phone]                   EKG:      ED LABS ORDERED AND REVIEWED:  No visits with results within 1 Day(s) from this visit.   Latest known visit with results is:   Lab Visit on 09/29/2022   Component Date Value Ref Range Status    Sodium Level 09/29/2022 135 (L)  136 - 145 mmol/L Final    Potassium Level 09/29/2022 4.0  3.5 - 5.1 mmol/L Final    Chloride 09/29/2022 93 (L)  98 - 107 mmol/L Final    Carbon Dioxide 09/29/2022 30  23 - 31 mmol/L Final    Glucose Level 09/29/2022 109  82 - 115 mg/dL Final    Blood Urea Nitrogen 09/29/2022 5.0 (L)  8.4 - 25.7 mg/dL Final    Creatinine 09/29/2022 0.74  0.73 - 1.18 mg/dL Final    Calcium Level Total 09/29/2022 9.8  8.8 - 10.0 mg/dL Final    Protein Total 09/29/2022 7.6  5.8 - 7.6 gm/dL Final    Albumin Level 09/29/2022 4.3  3.4 - 4.8 gm/dL Final    Globulin 09/29/2022 3.3  2.4 - 3.5 gm/dL Final    Albumin/Globulin Ratio 09/29/2022 1.3  1.1 - 2.0 ratio Final    Bilirubin Total 09/29/2022 1.6 (H)  <=1.5 mg/dL Final    Alkaline Phosphatase 09/29/2022 112  40 - 150 unit/L Final    Alanine Aminotransferase 09/29/2022 59 (H)  0 - 55 unit/L Final    Aspartate Aminotransferase 09/29/2022 100 (H)  5 - 34 unit/L Final    eGFR 09/29/2022 >60  mls/min/1.73/m2 Final    Cholesterol Total 09/29/2022 173  <=200 mg/dL Final    HDL Cholesterol 09/29/2022 118 (H)  35 - 60 mg/dL Final    Triglyceride 09/29/2022 46  34 - 140 mg/dL Final    Cholesterol/HDL Ratio 09/29/2022 1  0 - 5 Final    Very Low Density Lipoprotein 09/29/2022 9   Final    LDL Cholesterol 09/29/2022 46.00 (L)  50.00 - 140.00 mg/dL Final    Folate Level 09/29/2022 10.2  7.0 - 31.4 ng/mL Final    Thyroid Stimulating Hormone 09/29/2022 1.9303  0.3500 - 4.9400 uIU/mL Final    Vitamin B12 Level 09/29/2022 341  213 - 816 pg/mL Final       RADIOLOGY STUDIES ORDERED AND REVIEWED:  Imaging Results              CT Head Without Contrast (Preliminary result)  Result time 01/24/23 23:47:22       Preliminary result by Ruben Powell Jr., MD (01/24/23 23:47:22)                   Narrative:    START OF REPORT:  TECHNIQUE: CT OF THE HEAD WAS PERFORMED WITHOUT INTRAVENOUS CONTRAST WITH AXIAL AS WELL AS CORONAL AND SAGITTAL IMAGES.    COMPARISON: NONE.    DOSAGE INFORMATION: AUTOMATED EXPOSURE CONTROL WAS UTILIZED.    CLINICAL HISTORY: HEAD TRAUMA.    Findings:  Hemorrhage: No acute intracranial hemorrhage is seen.  CSF spaces: The ventricles sulci and basal cisterns are within normal limits.  Brain parenchyma: There is preservation of the grey white junction throughout. Mild scattered microvascular change is seen in portions of the periventricular and deep white matter tracts.  Cerebellum: Unremarkable.  Sella and skull base: The sella appears to be within normal limits for age.  Intracranial calcifications: Incidental note is made of bilateral choroid plexus calcification. Incidental note is made of some pineal region calcification.  Calvarium: No acute linear or depressed skull fracture is seen.    Maxillofacial Structures: Old fixation changes left mid face.  Paranasal sinuses: The visualized paranasal sinuses appear clear with no significant mucoperiosteal thickening or air fluid levels identified.  Orbits: Surgical fixation with mesh of the floor of left orbit.  Temporal bones and mastoids: The temporal bones and mastoids appear unremarkable.  TMJ: The mandibular condyles appear normally placed with respect to the mandibular fossa.      Impression:  1. No acute intracranial process identified. Details and findings as noted above.                          Preliminary result by On[employer], Rad Results In (01/24/23 23:47:22)                   Narrative:    START OF REPORT:  TECHNIQUE: CT OF THE HEAD WAS PERFORMED WITHOUT INTRAVENOUS CONTRAST WITH AXIAL AS WELL AS CORONAL AND SAGITTAL IMAGES.    COMPARISON: NONE.    DOSAGE INFORMATION: AUTOMATED EXPOSURE CONTROL WAS UTILIZED.    CLINICAL HISTORY: HEAD  TRAUMA.    Findings:  Hemorrhage: No acute intracranial hemorrhage is seen.  CSF spaces: The ventricles sulci and basal cisterns are within normal limits.  Brain parenchyma: There is preservation of the grey white junction throughout. Mild scattered microvascular change is seen in portions of the periventricular and deep white matter tracts.  Cerebellum: Unremarkable.  Sella and skull base: The sella appears to be within normal limits for age.  Intracranial calcifications: Incidental note is made of bilateral choroid plexus calcification. Incidental note is made of some pineal region calcification.  Calvarium: No acute linear or depressed skull fracture is seen.    Maxillofacial Structures: Old fixation changes left mid face.  Paranasal sinuses: The visualized paranasal sinuses appear clear with no significant mucoperiosteal thickening or air fluid levels identified.  Orbits: Surgical fixation with mesh of the floor of left orbit.  Temporal bones and mastoids: The temporal bones and mastoids appear unremarkable.  TMJ: The mandibular condyles appear normally placed with respect to the mandibular fossa.      Impression:  1. No acute intracranial process identified. Details and findings as noted above.                                         X-Ray Chest PA And Lateral (Preliminary result)  Result time 01/24/23 23:17:01      ED Interpretation by Lauren Perkins MD (01/24/23 23:17:01, [facility] - Emergency Dept, Emergency Medicine)    X-ray chest Two view:  Independently reviewed and/or interpreted by Lauren Perkins MD  No focal consolidation, no acute cardiothoracic abnormality identified, this some haziness in the left lower lung field, no cross rib fractures identified                                    PROCEDURES PERFORMED IN ED:  Procedures     DIAGNOSTIC IMPRESSION:        ICD-10-CM ICD-9-CM   1. Contusion of right chest wall, initial encounter  S20.211A 922.1   2. Rib pain  R07.81 786.50           ED Disposition Condition    Discharge Stable               Medication List        ASK your doctor about these medications      alendronate 70 MG tablet  Commonly known as: FOSAMAX     atorvastatin 10 MG tablet  Commonly known as: LIPITOR     baclofen 20 MG tablet  Commonly known as: LIORESAL  Take 1 tablet (20 mg total) by mouth 3 (three) times daily as needed (Muscle spasms).     citalopram 20 MG tablet  Commonly known as: CeleXA     folic acid 400 MCG tablet  Commonly known as: FOLVITE     zaleplon 10 MG capsule  Commonly known as: SONATA               ED Prescriptions    None       Follow-up Information       Follow up With Specialties Details Why Contact Info    Roland Santiago MD Family Medicine In 2 days  [address] [place] [address]  [phone]               Lauren Perkins MD  01/24/23 [id number]

## 2023-02-02 ENCOUNTER — HOSPITAL ENCOUNTER (OUTPATIENT)
Dept: RADIOLOGY | Facility: HOSPITAL | Age: 73
Discharge: HOME | End: 2023-02-02
Payer: MEDICARE

## 2023-02-02 DIAGNOSIS — R04.2 HEMOPTYSIS: Primary | ICD-10-CM

## 2023-02-02 DIAGNOSIS — R04.2 HEMOPTYSIS: ICD-10-CM

## 2023-02-02 PROCEDURE — 71046 X-RAY EXAM CHEST 2 VIEWS: CPT | Mod: TC

## 2023-02-09 DIAGNOSIS — R04.2 COUGHING UP BLOOD: Primary | ICD-10-CM

## 2023-02-14 ENCOUNTER — HOSPITAL ENCOUNTER (OUTPATIENT)
Dept: RADIOLOGY | Facility: HOSPITAL | Age: 73
Discharge: HOME | End: 2023-02-14
Attending: OTOLARYNGOLOGY
Payer: MEDICARE

## 2023-02-14 DIAGNOSIS — R04.2 COUGHING UP BLOOD: ICD-10-CM

## 2023-02-14 LAB
CREAT SERPL-MCNC: 0.7 MG/DL (ref 0.5–1.4)
SAMPLE: NORMAL

## 2023-02-14 PROCEDURE — 25500020 PHARM REV CODE 255: Performed by: OTOLARYNGOLOGY

## 2023-02-14 PROCEDURE — 71260 CT THORAX DX C+: CPT | Mod: TC

## 2023-02-14 RX ADMIN — IOHEXOL 100 ML: 300 INJECTION, SOLUTION INTRAVENOUS at 11:02

## 2023-06-05 DIAGNOSIS — J32.2 CHRONIC ETHMOIDAL SINUSITIS: Primary | ICD-10-CM

## 2023-06-06 DIAGNOSIS — R91.8 OTHER NONSPECIFIC ABNORMAL FINDING OF LUNG FIELD: Primary | ICD-10-CM

## 2023-06-06 DIAGNOSIS — R91.8 LUNG NODULES: ICD-10-CM

## 2023-06-08 ENCOUNTER — HOSPITAL ENCOUNTER (OUTPATIENT)
Dept: RADIOLOGY | Facility: HOSPITAL | Age: 73
Discharge: HOME | End: 2023-06-08
Attending: FAMILY MEDICINE
Payer: MEDICARE

## 2023-06-08 DIAGNOSIS — R91.8 OTHER NONSPECIFIC ABNORMAL FINDING OF LUNG FIELD: ICD-10-CM

## 2023-06-08 PROCEDURE — 71250 CT THORAX DX C-: CPT | Mod: TC

## 2023-06-15 ENCOUNTER — LAB VISIT (OUTPATIENT)
Dept: LAB | Facility: HOSPITAL | Age: 73
End: 2023-06-15
Attending: FAMILY MEDICINE
Payer: MEDICARE

## 2023-06-15 DIAGNOSIS — E55.9 AVITAMINOSIS D: Primary | ICD-10-CM

## 2023-06-15 LAB — DEPRECATED CALCIDIOL+CALCIFEROL SERPL-MC: 28 NG/ML (ref 30–80)

## 2023-06-15 PROCEDURE — 36415 COLL VENOUS BLD VENIPUNCTURE: CPT

## 2023-06-15 PROCEDURE — 82306 VITAMIN D 25 HYDROXY: CPT

## 2023-06-19 ENCOUNTER — HOSPITAL ENCOUNTER (OUTPATIENT)
Dept: RADIOLOGY | Facility: HOSPITAL | Age: 73
Discharge: HOME | End: 2023-06-19
Attending: OTOLARYNGOLOGY
Payer: MEDICARE

## 2023-06-19 DIAGNOSIS — J32.2 CHRONIC ETHMOIDAL SINUSITIS: ICD-10-CM

## 2023-06-19 PROCEDURE — 70486 CT MAXILLOFACIAL W/O DYE: CPT | Mod: TC

## 2023-10-08 ENCOUNTER — HOSPITAL ENCOUNTER (EMERGENCY)
Facility: HOSPITAL | Age: 73
Discharge: HOME | End: 2023-10-08
Attending: GENERAL ACUTE CARE HOSPITAL
Payer: MEDICARE

## 2023-10-08 VITALS
WEIGHT: 175 LBS | HEART RATE: 62 BPM | BODY MASS INDEX: 25.05 KG/M2 | HEIGHT: 70 IN | TEMPERATURE: 98 F | OXYGEN SATURATION: 98 % | RESPIRATION RATE: 16 BRPM | DIASTOLIC BLOOD PRESSURE: 86 MMHG | SYSTOLIC BLOOD PRESSURE: 160 MMHG
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
## 2023-10-08 DIAGNOSIS — S70.01XA CONTUSION OF RIGHT HIP, INITIAL ENCOUNTER: ICD-10-CM

## 2023-10-08 PROCEDURE — 25000003 PHARM REV CODE 250: Performed by: PHYSICIAN ASSISTANT

## 2023-10-08 PROCEDURE — 99283 EMERGENCY DEPT VISIT LOW MDM: CPT

## 2023-10-08 RX ORDER — HYDROCODONE BITARTRATE AND ACETAMINOPHEN 5; 325 MG/1; MG/1
1 TABLET ORAL EVERY 8 HOURS PRN
Qty: 9 TABLET | Refills: 0 | Status: SHIPPED | OUTPATIENT
Start: 2023-10-08 | End: 2023-10-11

## 2023-10-08 RX ORDER — HYDROCODONE BITARTRATE AND ACETAMINOPHEN 7.5; 325 MG/1; MG/1
1 TABLET ORAL
Status: COMPLETED | OUTPATIENT
Start: 2023-10-08 | End: 2023-10-08

## 2023-10-08 RX ADMIN — HYDROCODONE BITARTRATE AND ACETAMINOPHEN 1 TABLET: 7.5; 325 TABLET ORAL at 06:10

## 2023-10-08 NOTE — ED PROVIDER NOTES
Encounter Date: 10/8/2023       History     Chief Complaint   Patient presents with    Fall     Fall 1 week ago. Right hip pain that is getting worse     72 y.o. male presents to the ED with right hip pain s/t fall onset 1 week ago.  States that he has been taking ibuprofen at home with minimal relief.  Denies hitting his head during the fall.  Has been ambulatory at home.  States he has had previous surgery on this right hip as well.    The history is provided by the patient. No  was used.     Review of patient's allergies indicates:  No Known Allergies  Past Medical History:   Diagnosis Date    Mixed hyperlipidemia     Osteoporosis      Past Surgical History:   Procedure Laterality Date    SHOULDER SURGERY Right      Family History   Problem Relation Age of Onset    Cancer Mother     Cancer Father     Lung cancer Father     Hypertension Maternal Grandfather     Heart disease Maternal Grandfather      Social History     Tobacco Use    Smoking status: Former     Types: Cigarettes   Substance Use Topics    Alcohol use: Yes     Comment: daily    Drug use: Yes     Types: Marijuana     Review of Systems   Constitutional:  Negative for fever.   HENT:  Negative for sore throat.    Respiratory:  Negative for shortness of breath.    Cardiovascular:  Negative for chest pain.   Gastrointestinal:  Negative for nausea.   Genitourinary:  Negative for dysuria.   Musculoskeletal:  Positive for arthralgias. Negative for back pain.   Skin:  Negative for rash.   Neurological:  Negative for weakness.   Hematological:  Does not bruise/bleed easily.   All other systems reviewed and are negative.      Physical Exam     Initial Vitals [10/08/23 1653]   BP Pulse Resp Temp SpO2   120/71 (!) 55 18 97.4 °F (36.3 °C) 99 %      MAP       --         Physical Exam    Nursing note and vitals reviewed.  Constitutional: He appears well-developed and well-nourished.   HENT:   Head: Normocephalic and atraumatic.   Eyes: EOM are  normal. Pupils are equal, round, and reactive to light.   Neck: Neck supple.   Normal range of motion.  Cardiovascular:  Normal rate, regular rhythm, normal heart sounds and intact distal pulses.           Pulmonary/Chest: Breath sounds normal.   Abdominal: Abdomen is soft. There is no abdominal tenderness.   Musculoskeletal:         General: Normal range of motion.      Cervical back: Normal, normal range of motion and neck supple.      Thoracic back: Normal.      Lumbar back: Normal.      Right hip: Tenderness present. No deformity, lacerations, bony tenderness or crepitus. Normal range of motion. Normal strength.      Left hip: Normal.     Neurological: He is alert and oriented to person, place, and time. He has normal strength. GCS score is 15. GCS eye subscore is 4. GCS verbal subscore is 5. GCS motor subscore is 6.   Skin: Skin is warm and dry. Capillary refill takes less than 2 seconds.   Psychiatric: He has a normal mood and affect.         ED Course   Procedures  Labs Reviewed - No data to display       Imaging Results              X-Ray Hip 2 or 3 views Right (with Pelvis when performed) (Preliminary result)  Result time 10/08/23 18:46:39      Wet Read by Moncho Stapleton PA-C (10/08/23 18:46:39, [facility] - Emergency Dept, Emergency Medicine)    No acute osseous findings                                     Medications   HYDROcodone-acetaminophen 7.5-325 mg per tablet 1 tablet (1 tablet Oral Given 10/8/23 0355)     Medical Decision Making  Differential diagnosis:  Contusion, fracture, dislocation    72 y.o. male presents to the ED with right hip pain s/t fall onset 1 week ago.  States that he has been taking ibuprofen at home with minimal relief.  Denies hitting his head during the fall.  Has been ambulatory at home.  States he has had previous surgery on this right hip as well.  On exam, some tenderness overlying the right hip however no obvious gross deformity.  No decreased range of  motion.  X-ray benign.  Will give pain medication and sent home with short course as well.    Amount and/or Complexity of Data Reviewed  Radiology: ordered.    Risk  Prescription drug management.                               Clinical Impression:   Final diagnoses:  [W19.XXXA] Fall, initial encounter (Primary)  [S70.01XA] Contusion of right hip, initial encounter        ED Disposition Condition    Discharge Stable          ED Prescriptions       Medication Sig Dispense Start Date End Date Auth. Provider    HYDROcodone-acetaminophen (NORCO) 5-325 mg per tablet Take 1 tablet by mouth every 8 (eight) hours as needed for Pain. 9 tablet 10/8/2023 10/11/2023 Moncho Stapleton PA-C          Follow-up Information       Follow up With Specialties Details Why Contact Info    Roland Santiago MD Family Medicine  As needed [address] [address]  [phone]               Moncho Stapleton PA-C  10/08/23 6964

## 2023-10-19 ENCOUNTER — HOSPITAL ENCOUNTER (OUTPATIENT)
Dept: RADIOLOGY | Facility: HOSPITAL | Age: 73
Discharge: HOME | End: 2023-10-19
Attending: FAMILY MEDICINE
Payer: MEDICARE

## 2023-10-19 DIAGNOSIS — R52 PAIN: ICD-10-CM

## 2023-10-19 PROCEDURE — 72100 X-RAY EXAM L-S SPINE 2/3 VWS: CPT | Mod: TC

## 2023-12-04 ENCOUNTER — LAB VISIT (OUTPATIENT)
Dept: LAB | Facility: HOSPITAL | Age: 73
End: 2023-12-04
Attending: FAMILY MEDICINE
Payer: MEDICARE

## 2023-12-04 DIAGNOSIS — E55.9 VITAMIN D DEFICIENCY: ICD-10-CM

## 2023-12-04 DIAGNOSIS — I10 HYPERTENSION, UNSPECIFIED TYPE: Primary | ICD-10-CM

## 2023-12-04 LAB
ALBUMIN SERPL-MCNC: 3.9 G/DL (ref 3.4–4.8)
ALBUMIN/GLOB SERPL: 1.3 RATIO (ref 1.1–2)
ALP SERPL-CCNC: 90 UNIT/L (ref 40–150)
ALT SERPL-CCNC: 25 UNIT/L (ref 0–55)
AST SERPL-CCNC: 27 UNIT/L (ref 5–34)
BASOPHILS # BLD AUTO: 0.04 X10(3)/MCL
BASOPHILS NFR BLD AUTO: 0.9 %
BILIRUB SERPL-MCNC: 0.9 MG/DL
BUN SERPL-MCNC: 10 MG/DL (ref 8.4–25.7)
CALCIUM SERPL-MCNC: 9.2 MG/DL (ref 8.8–10)
CHLORIDE SERPL-SCNC: 97 MMOL/L (ref 98–107)
CHOLEST SERPL-MCNC: 126 MG/DL
CHOLEST/HDLC SERPL: 2 {RATIO} (ref 0–5)
CO2 SERPL-SCNC: 31 MMOL/L (ref 23–31)
CREAT SERPL-MCNC: 0.82 MG/DL (ref 0.73–1.18)
EOSINOPHIL # BLD AUTO: 0.1 X10(3)/MCL (ref 0–0.9)
EOSINOPHIL NFR BLD AUTO: 2.2 %
ERYTHROCYTE [DISTWIDTH] IN BLOOD BY AUTOMATED COUNT: 11.9 % (ref 11.5–17)
GFR SERPLBLD CREATININE-BSD FMLA CKD-EPI: >60 MLS/MIN/1.73/M2
GLOBULIN SER-MCNC: 3.1 GM/DL (ref 2.4–3.5)
GLUCOSE SERPL-MCNC: 101 MG/DL (ref 82–115)
HCT VFR BLD AUTO: 39.7 % (ref 42–52)
HDLC SERPL-MCNC: 77 MG/DL (ref 35–60)
HGB BLD-MCNC: 13.8 G/DL (ref 14–18)
IMM GRANULOCYTES # BLD AUTO: 0.01 X10(3)/MCL (ref 0–0.04)
IMM GRANULOCYTES NFR BLD AUTO: 0.2 %
LDLC SERPL CALC-MCNC: 41 MG/DL (ref 50–140)
LYMPHOCYTES # BLD AUTO: 1.44 X10(3)/MCL (ref 0.6–4.6)
LYMPHOCYTES NFR BLD AUTO: 32.1 %
MCH RBC QN AUTO: 34.1 PG (ref 27–31)
MCHC RBC AUTO-ENTMCNC: 34.8 G/DL (ref 33–36)
MCV RBC AUTO: 98 FL (ref 80–94)
MONOCYTES # BLD AUTO: 0.77 X10(3)/MCL (ref 0.1–1.3)
MONOCYTES NFR BLD AUTO: 17.2 %
NEUTROPHILS # BLD AUTO: 2.12 X10(3)/MCL (ref 2.1–9.2)
NEUTROPHILS NFR BLD AUTO: 47.4 %
PLATELET # BLD AUTO: 244 X10(3)/MCL (ref 130–400)
PMV BLD AUTO: 9.4 FL (ref 7.4–10.4)
POTASSIUM SERPL-SCNC: 3.9 MMOL/L (ref 3.5–5.1)
PROT SERPL-MCNC: 7 GM/DL (ref 5.8–7.6)
RBC # BLD AUTO: 4.05 X10(6)/MCL (ref 4.7–6.1)
SODIUM SERPL-SCNC: 134 MMOL/L (ref 136–145)
TRIGL SERPL-MCNC: 38 MG/DL (ref 34–140)
TSH SERPL-ACNC: 1.25 UIU/ML (ref 0.35–4.94)
VLDLC SERPL CALC-MCNC: 8 MG/DL
WBC # SPEC AUTO: 4.48 X10(3)/MCL (ref 4.5–11.5)

## 2023-12-04 PROCEDURE — 85025 COMPLETE CBC W/AUTO DIFF WBC: CPT

## 2023-12-04 PROCEDURE — 84443 ASSAY THYROID STIM HORMONE: CPT

## 2023-12-04 PROCEDURE — 80061 LIPID PANEL: CPT

## 2023-12-04 PROCEDURE — 82306 VITAMIN D 25 HYDROXY: CPT

## 2023-12-04 PROCEDURE — 36415 COLL VENOUS BLD VENIPUNCTURE: CPT

## 2023-12-04 PROCEDURE — 80053 COMPREHEN METABOLIC PANEL: CPT

## 2023-12-05 LAB — DEPRECATED CALCIDIOL+CALCIFEROL SERPL-MC: 58 NG/ML (ref 30–80)

## 2024-12-09 ENCOUNTER — LAB VISIT (OUTPATIENT)
Dept: LAB | Facility: HOSPITAL | Age: 74
End: 2024-12-09
Attending: FAMILY MEDICINE
Payer: MEDICARE

## 2024-12-09 DIAGNOSIS — Z12.5 SPECIAL SCREENING FOR MALIGNANT NEOPLASM OF PROSTATE: ICD-10-CM

## 2024-12-09 DIAGNOSIS — E53.8 VITAMIN B12 DEFICIENCY (NON ANEMIC): ICD-10-CM

## 2024-12-09 DIAGNOSIS — I10 HYPERTENSION, UNSPECIFIED TYPE: Primary | ICD-10-CM

## 2024-12-09 LAB
ALBUMIN SERPL-MCNC: 4.1 G/DL (ref 3.4–4.8)
ALBUMIN/GLOB SERPL: 1.1 RATIO (ref 1.1–2)
ALP SERPL-CCNC: 86 UNIT/L (ref 40–150)
ALT SERPL-CCNC: 23 UNIT/L (ref 0–55)
ANION GAP SERPL CALC-SCNC: 8 MEQ/L
AST SERPL-CCNC: 25 UNIT/L (ref 5–34)
BASOPHILS # BLD AUTO: 0.06 X10(3)/MCL
BASOPHILS NFR BLD AUTO: 1.1 %
BILIRUB SERPL-MCNC: 0.9 MG/DL
BUN SERPL-MCNC: 8 MG/DL (ref 8.4–25.7)
CALCIUM SERPL-MCNC: 10.2 MG/DL (ref 8.8–10)
CHLORIDE SERPL-SCNC: 95 MMOL/L (ref 98–107)
CHOLEST SERPL-MCNC: 140 MG/DL
CHOLEST/HDLC SERPL: 2 {RATIO} (ref 0–5)
CO2 SERPL-SCNC: 28 MMOL/L (ref 23–31)
CREAT SERPL-MCNC: 0.79 MG/DL (ref 0.72–1.25)
CREAT/UREA NIT SERPL: 10
EOSINOPHIL # BLD AUTO: 0.13 X10(3)/MCL (ref 0–0.9)
EOSINOPHIL NFR BLD AUTO: 2.3 %
ERYTHROCYTE [DISTWIDTH] IN BLOOD BY AUTOMATED COUNT: 12.2 % (ref 11.5–17)
GFR SERPLBLD CREATININE-BSD FMLA CKD-EPI: >60 ML/MIN/1.73/M2
GLOBULIN SER-MCNC: 3.7 GM/DL (ref 2.4–3.5)
GLUCOSE SERPL-MCNC: 108 MG/DL (ref 82–115)
HCT VFR BLD AUTO: 44.7 % (ref 42–52)
HDLC SERPL-MCNC: 88 MG/DL (ref 35–60)
HGB BLD-MCNC: 15.9 G/DL (ref 14–18)
IMM GRANULOCYTES # BLD AUTO: 0.01 X10(3)/MCL (ref 0–0.04)
IMM GRANULOCYTES NFR BLD AUTO: 0.2 %
LDLC SERPL CALC-MCNC: 43 MG/DL (ref 50–140)
LYMPHOCYTES # BLD AUTO: 1.66 X10(3)/MCL (ref 0.6–4.6)
LYMPHOCYTES NFR BLD AUTO: 29.5 %
MCH RBC QN AUTO: 34.4 PG (ref 27–31)
MCHC RBC AUTO-ENTMCNC: 35.6 G/DL (ref 33–36)
MCV RBC AUTO: 96.8 FL (ref 80–94)
MONOCYTES # BLD AUTO: 1.07 X10(3)/MCL (ref 0.1–1.3)
MONOCYTES NFR BLD AUTO: 19 %
NEUTROPHILS # BLD AUTO: 2.69 X10(3)/MCL (ref 2.1–9.2)
NEUTROPHILS NFR BLD AUTO: 47.9 %
PLATELET # BLD AUTO: 282 X10(3)/MCL (ref 130–400)
PMV BLD AUTO: 9.7 FL (ref 7.4–10.4)
POTASSIUM SERPL-SCNC: 3.8 MMOL/L (ref 3.5–5.1)
PROT SERPL-MCNC: 7.8 GM/DL (ref 5.8–7.6)
PSA SERPL-MCNC: 0.72 NG/ML
RBC # BLD AUTO: 4.62 X10(6)/MCL (ref 4.7–6.1)
SODIUM SERPL-SCNC: 131 MMOL/L (ref 136–145)
TRIGL SERPL-MCNC: 46 MG/DL (ref 34–140)
TSH SERPL-ACNC: 2.43 UIU/ML (ref 0.35–4.94)
VLDLC SERPL CALC-MCNC: 9 MG/DL
WBC # BLD AUTO: 5.62 X10(3)/MCL (ref 4.5–11.5)

## 2024-12-09 PROCEDURE — 84153 ASSAY OF PSA TOTAL: CPT

## 2024-12-09 PROCEDURE — 84443 ASSAY THYROID STIM HORMONE: CPT

## 2024-12-09 PROCEDURE — 36415 COLL VENOUS BLD VENIPUNCTURE: CPT

## 2024-12-09 PROCEDURE — 80053 COMPREHEN METABOLIC PANEL: CPT

## 2024-12-09 PROCEDURE — 80061 LIPID PANEL: CPT

## 2024-12-09 PROCEDURE — 85025 COMPLETE CBC W/AUTO DIFF WBC: CPT

## 2024-12-09 PROCEDURE — 82746 ASSAY OF FOLIC ACID SERUM: CPT

## 2024-12-10 LAB — FOLATE SERPL-MCNC: 12 NG/ML (ref 7–31.4)

## 2024-12-18 DIAGNOSIS — Z87.891 PERSONAL HISTORY OF TOBACCO USE, PRESENTING HAZARDS TO HEALTH: Primary | ICD-10-CM

## 2024-12-24 ENCOUNTER — HOSPITAL ENCOUNTER (OUTPATIENT)
Dept: RADIOLOGY | Facility: HOSPITAL | Age: 74
Discharge: HOME | End: 2024-12-24
Attending: FAMILY MEDICINE
Payer: MEDICARE

## 2024-12-24 DIAGNOSIS — Z87.891 PERSONAL HISTORY OF TOBACCO USE, PRESENTING HAZARDS TO HEALTH: ICD-10-CM

## 2024-12-24 PROCEDURE — 71271 CT THORAX LUNG CANCER SCR C-: CPT | Mod: TC

## 2025-02-05 ENCOUNTER — HOSPITAL ENCOUNTER (OUTPATIENT)
Dept: RADIOLOGY | Facility: HOSPITAL | Age: 75
Discharge: HOME | End: 2025-02-05
Attending: FAMILY MEDICINE
Payer: MEDICARE

## 2025-02-05 DIAGNOSIS — R07.89 OTHER CHEST PAIN: ICD-10-CM

## 2025-02-05 PROCEDURE — 71046 X-RAY EXAM CHEST 2 VIEWS: CPT | Mod: TC

## 2025-02-05 PROCEDURE — 71101 X-RAY EXAM UNILAT RIBS/CHEST: CPT | Mod: TC,RT
